# Patient Record
Sex: FEMALE | Race: OTHER | URBAN - METROPOLITAN AREA
[De-identification: names, ages, dates, MRNs, and addresses within clinical notes are randomized per-mention and may not be internally consistent; named-entity substitution may affect disease eponyms.]

---

## 2024-07-12 ENCOUNTER — OFFICE (OUTPATIENT)
Dept: URBAN - METROPOLITAN AREA CLINIC 26 | Facility: CLINIC | Age: 64
End: 2024-07-12
Payer: COMMERCIAL

## 2024-07-12 VITALS
TEMPERATURE: 97.2 F | SYSTOLIC BLOOD PRESSURE: 125 MMHG | DIASTOLIC BLOOD PRESSURE: 86 MMHG | WEIGHT: 238 LBS | HEIGHT: 64 IN | HEART RATE: 101 BPM

## 2024-07-12 DIAGNOSIS — R07.2 PRECORDIAL PAIN: ICD-10-CM

## 2024-07-12 DIAGNOSIS — R13.10 DYSPHAGIA, UNSPECIFIED: ICD-10-CM

## 2024-07-12 DIAGNOSIS — R12 HEARTBURN: ICD-10-CM

## 2024-07-12 DIAGNOSIS — R19.5 OTHER FECAL ABNORMALITIES: ICD-10-CM

## 2024-07-12 DIAGNOSIS — Z12.11 ENCOUNTER FOR SCREENING FOR MALIGNANT NEOPLASM OF COLON: ICD-10-CM

## 2024-07-12 PROCEDURE — 99204 OFFICE O/P NEW MOD 45 MIN: CPT | Performed by: INTERNAL MEDICINE

## 2024-09-04 PROBLEM — K20.80 OTHER ESOPHAGITIS WITHOUT BLEEDING: Status: ACTIVE | Noted: 2024-09-04

## 2024-09-04 PROBLEM — K20.90 ESOPHAGITIS, UNSPECIFIED WITHOUT BLEEDING: Status: ACTIVE | Noted: 2024-09-04

## 2024-09-04 PROBLEM — K63.5 POLYP OF COLON: Status: ACTIVE | Noted: 2024-09-04

## 2024-09-04 PROBLEM — K22.89 OTHER SPECIFIED DISEASE OF ESOPHAGUS: Status: ACTIVE | Noted: 2024-09-04

## 2024-09-04 PROBLEM — K57.30 DIVERTICULOSIS OF LARGE INTESTINE WITHOUT PERFORATION OR ABS: Status: ACTIVE | Noted: 2024-09-04

## 2024-11-11 ENCOUNTER — OFFICE VISIT (OUTPATIENT)
Dept: URGENT CARE | Age: 64
End: 2024-11-11
Payer: COMMERCIAL

## 2024-11-11 VITALS
RESPIRATION RATE: 16 BRPM | SYSTOLIC BLOOD PRESSURE: 152 MMHG | HEART RATE: 77 BPM | WEIGHT: 230 LBS | OXYGEN SATURATION: 97 % | TEMPERATURE: 97 F | DIASTOLIC BLOOD PRESSURE: 94 MMHG

## 2024-11-11 DIAGNOSIS — M25.512 ACUTE PAIN OF LEFT SHOULDER: ICD-10-CM

## 2024-11-11 DIAGNOSIS — R07.9 CHEST PAIN, UNSPECIFIED TYPE: Primary | ICD-10-CM

## 2024-11-11 RX ORDER — THYROID, PORCINE 120 MG/1
TABLET ORAL
COMMUNITY
Start: 2024-08-30

## 2024-11-11 RX ORDER — NEOMYCIN SULFATE, POLYMYXIN B SULFATE, AND DEXAMETHASONE 3.5; 10000; 1 MG/G; [USP'U]/G; MG/G
OINTMENT OPHTHALMIC
COMMUNITY
Start: 2024-10-28

## 2024-11-11 ASSESSMENT — ENCOUNTER SYMPTOMS
RESPIRATORY NEGATIVE: 1
GASTROINTESTINAL NEGATIVE: 1
HEMATOLOGIC/LYMPHATIC NEGATIVE: 1
CONSTITUTIONAL NEGATIVE: 1
EYES NEGATIVE: 1
MUSCULOSKELETAL NEGATIVE: 1
NEUROLOGICAL NEGATIVE: 1
PSYCHIATRIC NEGATIVE: 1
ALLERGIC/IMMUNOLOGIC NEGATIVE: 1
ENDOCRINE NEGATIVE: 1

## 2024-11-11 NOTE — PROGRESS NOTES
Subjective   Patient ID: Johana Cheng is a 64 y.o. female. They present today with a chief complaint of Chest Pain (Rt side into shoulder, initially started last week, worse yesterday, pain with breathing ).    History of Present Illness  Patient is a 65 y/o female presenting with left shoulder and chest pain with deep inspiration x2 days. Denies recent trauma/injury to shoulder/chest. Patient Denies symptoms of chest pain at rest, chest tightness/pressure, SOB, wheezing, palpitation, GERD-like symptoms, dizziness, blurry/double vision, diaphoresis, fever, chills, bodyaches, lethargy, extremity weakness or, chin/arm paresthesias/pain, N/V/D, ABD pain. No OTC medication reported to be taken. Patient reports x2-3 months prior she underwent cardiac cath to which displayed no blockage, as patient presented to ED with chest pain. Patient subsequently diagnosed with esophageal spasms and follows up with GI. No OTC medication reported to be taken for symptoms at this time.            Past Medical History  Allergies as of 11/11/2024 - Reviewed 11/11/2024   Allergen Reaction Noted    Cefdinir Hives 09/09/2015    Ibuprofen Hives and Swelling 11/11/2024    Penicillins Hives 09/09/2015    Quinolones Hallucinations 11/11/2024    Latex Rash 04/18/2016       (Not in a hospital admission)       History reviewed. No pertinent past medical history.    History reviewed. No pertinent surgical history.     reports that she has never smoked. She has never used smokeless tobacco.    Review of Systems  Review of Systems   Constitutional: Negative.    HENT: Negative.     Eyes: Negative.    Respiratory: Negative.     Cardiovascular:         Left shoulder and left anterior chest pain with deep inspiration   Gastrointestinal: Negative.    Endocrine: Negative.    Genitourinary: Negative.    Musculoskeletal: Negative.    Skin: Negative.    Allergic/Immunologic: Negative.    Neurological: Negative.    Hematological: Negative.     Psychiatric/Behavioral: Negative.                                    Objective    Vitals:    11/11/24 0938   BP: (!) 152/94   Pulse: 77   Resp: 16   Temp: 36.1 °C (97 °F)   SpO2: 97%   Weight: 104 kg (230 lb)     No LMP recorded.    Physical Exam  Constitutional:       Comments: Patient A/O x4, LOC 5, calm and cooperative. Patient self-ambulatory to treatment area and is in no acute distress.    HENT:      Head: Normocephalic and atraumatic.      Right Ear: Tympanic membrane normal.      Left Ear: Tympanic membrane normal.      Nose: Nose normal.      Mouth/Throat:      Mouth: Mucous membranes are dry.      Pharynx: Oropharynx is clear.   Eyes:      Extraocular Movements: Extraocular movements intact.      Conjunctiva/sclera: Conjunctivae normal.      Pupils: Pupils are equal, round, and reactive to light.   Cardiovascular:      Rate and Rhythm: Normal rate and regular rhythm.      Pulses: Normal pulses.      Heart sounds: Normal heart sounds.      Comments: No murmur, gallop, rub. No visible thrills.   Pulmonary:      Comments: No audible cough during physical examination. All lungfields clear to roomair per auscultation. Patient speaking in complete sentences without SOB or difficulty. Patient in no acute respiratory distress   Abdominal:      Comments: Abdominal contour is flat and symmetric. No visible lesions, pulsations or peristalsis noted. Bowel sounds present and normoactive in all four quadrants. No pain with light and deep palpation. No masses palpated. Negative CV tenderness to percussion bilaterally    Musculoskeletal:      Cervical back: Neck supple.      Comments: Bilateral cervical tilt/rotation, shoulder shrug, handgrip, bicep/tricep push-pull strengths, quadricep raise, calf resistance, dorsiplantar flexion/extension strengths are 5/5 and equal bilaterally. Sensation to light and deep palpation intact throughout; no paresthesias noted. Passive and active ROM completed with ease.    Skin:      General: Skin is warm and dry.      Capillary Refill: Capillary refill takes less than 2 seconds.      Comments: No edema, erythema to BLE, BUE.    Neurological:      General: No focal deficit present.      Mental Status: She is oriented to person, place, and time.   Psychiatric:         Mood and Affect: Mood normal.         Behavior: Behavior normal.         Procedures    Point of Care Test & Imaging Results from this visit  No results found for this visit on 11/11/24.   ECG 12 lead (Clinic Performed)    Result Date: 11/11/2024  Sinus Bradycardia. P waves followed by QRS, QRS with mild prolongation of 106ms, No PAC, PVS, No T wave inversion, No ST elevation     Diagnostic study results (if any) were reviewed by BENSON Shetty.    Assessment/Plan   Allergies, medications, history, and pertinent labs/EKGs/Imaging reviewed by BENSON Shetty.     Medical Decision Making  Patient is well-appearing in office; PE and EKG unremarkable. Patient advised UC is unable to r/o cardiopulmonary etiology of chest pain; patient states understanding. Differential diagnoses include costochondritis, phrenic nerve disturbance, MI, other cardiopulmonary etiologies unspecified. Patient was advised to monitor symptoms and to call 911/go directly to ED with worsening or persistent symptoms. At time of discharge, patient was clinically well-appearing and appropriate for outpatient management. The patient/parent/guardian was educated regarding diagnosis, supportive care, OTC and Rx medications. The patient/parent/guardian was given the opportunity to ask questions prior to discharge. They verbalized understanding of discussion of treatment plan, expected course of illness and/or injury, indications on when to return to , when to seek further evaluation in ED/call 911, and the need to follow up with PCP and/or specialist as referred. Patient/parent/guardian was provided with work/school documentation if requested. Patient  stable upon discharge.     Orders and Diagnoses  Diagnoses and all orders for this visit:  Chest pain, unspecified type  -     ECG 12 lead (Clinic Performed)  Acute pain of left shoulder  -     ECG 12 lead (Clinic Performed)      Medical Admin Record      Patient disposition: Home    Electronically signed by BENSON Shetty  11:32 AM

## 2025-08-15 ENCOUNTER — APPOINTMENT (OUTPATIENT)
Dept: OTOLARYNGOLOGY | Facility: CLINIC | Age: 65
End: 2025-08-15
Payer: COMMERCIAL

## 2025-08-15 VITALS — HEIGHT: 64 IN | BODY MASS INDEX: 38.58 KG/M2 | WEIGHT: 226 LBS

## 2025-08-15 DIAGNOSIS — C02.9 TONGUE CANCER (MULTI): ICD-10-CM

## 2025-08-15 DIAGNOSIS — Z03.89 OBSERVATION FOR SUSPECTED NEOPLASM: ICD-10-CM

## 2025-08-15 PROCEDURE — 31575 DIAGNOSTIC LARYNGOSCOPY: CPT | Performed by: OTOLARYNGOLOGY

## 2025-08-15 PROCEDURE — 3008F BODY MASS INDEX DOCD: CPT | Performed by: OTOLARYNGOLOGY

## 2025-08-15 PROCEDURE — 99205 OFFICE O/P NEW HI 60 MIN: CPT | Performed by: OTOLARYNGOLOGY

## 2025-08-15 ASSESSMENT — PATIENT HEALTH QUESTIONNAIRE - PHQ9
2. FEELING DOWN, DEPRESSED OR HOPELESS: NOT AT ALL
1. LITTLE INTEREST OR PLEASURE IN DOING THINGS: NOT AT ALL
SUM OF ALL RESPONSES TO PHQ9 QUESTIONS 1 AND 2: 0

## 2025-08-16 LAB
CREAT SERPL-MCNC: 0.8 MG/DL (ref 0.5–1.05)
EGFRCR SERPLBLD CKD-EPI 2021: 83 ML/MIN/1.73M2

## 2025-08-20 PROBLEM — C02.9 TONGUE CANCER (MULTI): Status: ACTIVE | Noted: 2025-08-20

## 2025-08-20 PROBLEM — Z03.89 OBSERVATION FOR SUSPECTED NEOPLASM: Status: ACTIVE | Noted: 2025-08-20

## 2025-08-21 ENCOUNTER — LAB REQUISITION (OUTPATIENT)
Dept: LAB | Facility: HOSPITAL | Age: 65
End: 2025-08-21
Payer: COMMERCIAL

## 2025-08-22 ENCOUNTER — HOSPITAL ENCOUNTER (OUTPATIENT)
Dept: RADIOLOGY | Facility: CLINIC | Age: 65
Discharge: HOME | End: 2025-08-22
Payer: COMMERCIAL

## 2025-08-22 DIAGNOSIS — Z03.89 OBSERVATION FOR SUSPECTED NEOPLASM: ICD-10-CM

## 2025-08-22 DIAGNOSIS — C02.9 TONGUE CANCER (MULTI): ICD-10-CM

## 2025-08-22 PROCEDURE — 70491 CT SOFT TISSUE NECK W/DYE: CPT

## 2025-08-22 PROCEDURE — 71260 CT THORAX DX C+: CPT

## 2025-08-22 PROCEDURE — 2550000001 HC RX 255 CONTRASTS: Performed by: OTOLARYNGOLOGY

## 2025-08-22 RX ADMIN — IOHEXOL 75 ML: 350 INJECTION, SOLUTION INTRAVENOUS at 09:21

## 2025-08-26 ENCOUNTER — TELEPHONE (OUTPATIENT)
Dept: OTOLARYNGOLOGY | Facility: CLINIC | Age: 65
End: 2025-08-26
Payer: COMMERCIAL

## 2025-08-26 ENCOUNTER — RESULTS FOLLOW-UP (OUTPATIENT)
Dept: OTOLARYNGOLOGY | Facility: CLINIC | Age: 65
End: 2025-08-26
Payer: COMMERCIAL

## 2025-08-26 DIAGNOSIS — C02.9 TONGUE CANCER (MULTI): ICD-10-CM

## 2025-08-29 ENCOUNTER — PROCEDURE VISIT (OUTPATIENT)
Dept: OTOLARYNGOLOGY | Facility: CLINIC | Age: 65
End: 2025-08-29
Payer: COMMERCIAL

## 2025-08-29 ENCOUNTER — TUMOR BOARD CONFERENCE (OUTPATIENT)
Dept: HEMATOLOGY/ONCOLOGY | Facility: HOSPITAL | Age: 65
End: 2025-08-29
Payer: COMMERCIAL

## 2025-08-29 ASSESSMENT — PATIENT HEALTH QUESTIONNAIRE - PHQ9
SUM OF ALL RESPONSES TO PHQ9 QUESTIONS 1 AND 2: 0
1. LITTLE INTEREST OR PLEASURE IN DOING THINGS: NOT AT ALL
2. FEELING DOWN, DEPRESSED OR HOPELESS: NOT AT ALL